# Patient Record
Sex: FEMALE | Race: WHITE
[De-identification: names, ages, dates, MRNs, and addresses within clinical notes are randomized per-mention and may not be internally consistent; named-entity substitution may affect disease eponyms.]

---

## 2019-03-18 ENCOUNTER — HOSPITAL ENCOUNTER (EMERGENCY)
Dept: HOSPITAL 59 - ER | Age: 34
Discharge: HOME | End: 2019-03-18
Payer: COMMERCIAL
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
## 2019-03-18 DIAGNOSIS — F10.10: Primary | ICD-10-CM

## 2019-03-18 DIAGNOSIS — I10: ICD-10-CM

## 2019-03-18 DIAGNOSIS — R11.2: ICD-10-CM

## 2019-03-18 DIAGNOSIS — F17.210: ICD-10-CM

## 2019-03-18 DIAGNOSIS — F07.9: ICD-10-CM

## 2019-03-18 LAB
ALBUMIN SERPL-MCNC: 4.8 G/DL (ref 4–5)
ALBUMIN/GLOB SERPL: 1.7 {RATIO} (ref 1.1–1.8)
ALP SERPL-CCNC: 62 U/L (ref 35–104)
ALT SERPL-CCNC: 22 U/L (ref ?–33)
ANION GAP SERPL CALC-SCNC: 19 MMOL/L (ref 7–16)
AST SERPL-CCNC: 65 U/L (ref 10–35)
BASOPHILS NFR BLD: 0.2 % (ref 0–6)
BILIRUB SERPL-MCNC: 0.9 MG/DL (ref 0.2–1)
BUN SERPL-MCNC: 13 MG/DL (ref 6–20)
CO2 SERPL-SCNC: 25 MMOL/L (ref 22–29)
CREAT SERPL-MCNC: 0.5 MG/DL (ref 0.5–0.9)
EOSINOPHIL NFR BLD: 0 % (ref 0–6)
ERYTHROCYTE [DISTWIDTH] IN BLOOD BY AUTOMATED COUNT: 14.8 % (ref 11.5–14.5)
EST GLOMERULAR FILTRATION RATE: > 60 ML/MIN
GLOBULIN SER-MCNC: 2.8 GM/DL (ref 1.4–4.8)
GLUCOSE SERPL-MCNC: 96 MG/DL (ref 74–109)
GRANULOCYTES NFR BLD: 66 % (ref 47–80)
HCT VFR BLD CALC: 38 % (ref 35–47)
HGB BLD-MCNC: 13.7 GM/DL (ref 11.6–16)
LYMPHOCYTES NFR BLD AUTO: 24.8 % (ref 16–45)
MCH RBC QN AUTO: 36.8 PG (ref 27–33)
MCHC RBC AUTO-ENTMCNC: 36.1 G/DL (ref 32–36)
MCV RBC AUTO: 102.2 FL (ref 81–97)
MONOCYTES NFR BLD: 9 % (ref 0–9)
PLATELET # BLD: 287 K/UL (ref 130–400)
PMV BLD AUTO: 8.6 FL (ref 7.4–10.4)
PROT SERPL-MCNC: 7.6 G/DL (ref 6.6–8.7)
RBC # BLD AUTO: 3.72 M/UL (ref 3.8–5.4)
WBC # BLD AUTO: 4.9 K/UL (ref 4.2–12.2)

## 2019-03-18 PROCEDURE — 96375 TX/PRO/DX INJ NEW DRUG ADDON: CPT

## 2019-03-18 PROCEDURE — 80320 DRUG SCREEN QUANTALCOHOLS: CPT

## 2019-03-18 PROCEDURE — 80053 COMPREHEN METABOLIC PANEL: CPT

## 2019-03-18 PROCEDURE — 99284 EMERGENCY DEPT VISIT MOD MDM: CPT

## 2019-03-18 PROCEDURE — 96365 THER/PROPH/DIAG IV INF INIT: CPT

## 2019-03-18 PROCEDURE — 93010 ELECTROCARDIOGRAM REPORT: CPT

## 2019-03-18 PROCEDURE — 85025 COMPLETE CBC W/AUTO DIFF WBC: CPT

## 2019-03-18 PROCEDURE — 93005 ELECTROCARDIOGRAM TRACING: CPT

## 2019-03-18 PROCEDURE — 83735 ASSAY OF MAGNESIUM: CPT

## 2019-03-18 NOTE — EMERGENCY DEPARTMENT RECORD
History of Present Illness





- General


Chief complaint: Dehydration


Stated complaint: DEHYDRATED/ALCOHOL RELATED


Time Seen by Provider: 19 19:08


Source: Patient


Mode of Arrival: Ambulatory


Limitations: No limitations





- History of Present Illness


Initial comments: 





Pt with long history of alcohol abuse relates issues with this abuse.  Pt 

states she had been "dry" for a month and then started drinking two weeks ago 

related to life stress.  Pts stated last drink was 48 hours ago.  She has  

history of withdraw seizures.  She has not had a seizure with this episode.  

She has been sleeping and had multiple episodes of emesis - without blood.  She 

has cramping abdominal pains.  She has no HA.  She is a smoker and denies other 

drug abuse.  +Hx of HTN and off meds today due to emesis. 


Onset/Timin


-: Week(s)


Location: Generalized


Severity: Moderate


Severity scale (1-10): 8


Quality: Aching


Consistency: Constant


Improves with: None


Worsens with: None


Associated Symptoms: Loss of appetite, Nausea/vomiting





- Related Data


 Home Medications











 Medication  Instructions  Recorded  Confirmed  Last Taken


 


Escitalopram Oxalate [Lexapro] 10 mg PO DAILY 19 Unknown


 


Losartan Potassium 50 mg PO DAILY 19 Unknown








 Previous Rx's











 Medication  Instructions  Recorded


 


Lorazepam [Ativan] 1 mg PO Q8HR PRN 3 Days #6 tablet 19











 Allergies











Allergy/AdvReac Type Severity Reaction Status Date / Time


 


amoxicillin [From Augmentin] AdvReac  NAUSEA Verified 19 19:00


 


clavulanic acid AdvReac  NAUSEA Verified 19 19:00





[From Augmentin]     


 


doxycycline AdvReac  NAUSEA Verified 19 19:00














Travel Screening





- Travel/Exposure Within Last 30 Days


Have you traveled within the last 30 days?: No





Review of Systems


Constitutional: Denies: Chills, Fever, Weakness


Eyes: Denies: Eye discharge, Photophobia


ENT: Denies: Congestion, Ear pain


Respiratory: Denies: Cough, Dyspnea, Hemoptysis


Cardiovascular: Denies: Arrhythmia, Chest pain, Syncope


Endocrine: Reports: Fatigue.  Denies: Polydipsia, Polyuria


Gastrointestinal: Reports: As per HPI, Abdominal pain, Nausea, Vomiting.  Denies

: Diarrhea, Hematemesis, Hematochezia, Melena


Genitourinary: Denies: Abnormal menses


Musculoskeletal: Denies: Back pain, Neck pain


Skin: Denies: Bruising, Rash


Neurological: Reports: Tremors.  Denies: Abnormal gait, Headache, Seizure


Psychiatric: Reports: Anxiety.  Denies: Homicidal thoughts, Suicidal thoughts


Hematological/Lymphatic: Denies: Anemia





Past Medical History





- SOCIAL HISTORY


Smoking Status: Current every day smoker


Alcohol Use: Heavy


Drug Use: None





- RESPIRATORY


Hx Respiratory Disorders: Yes


Hx Asthma: Yes





- CARDIOVASCULAR


Hx Cardio Disorders: No





- NEURO


Hx Neuro Disorders: Yes


Hx Seizures: Yes (since 2018)





- GI


Hx GI Disorders: Yes


Hx Abdominal Pain: Yes (colitis)


Hx Irritable Bowel: Yes (alcohol related)





- 


Hx Genitourinary Disorders: Yes


Hx Renal Disease: Yes (alcohol related)





- ENDOCRINE


Hx Endocrine Disorders: No





- MUSCULOSKELETAL


Hx Musculoskeletal Disorders: No





- PSYCH


Hx Psych Problems: Yes


Hx Anxiety: Yes


Hx Depression: Yes





- HEMATOLOGY/ONCOLOGY


Hx Hematology/Oncology Disorders: No





Family Medical History


Any Significant Family History?: No





Physical Exam





- General


General Appearance: Alert, Oriented x3, Cooperative, Mild distress





- Head


Head exam: Atraumatic





- Eye


Eye exam: PERRL, EOMI.  negative: Nystagmus





- ENT


ENT exam: Normal exam, Mucous membranes moist, Normal external ear exam, Normal 

orophraynx, TM's normal bilaterally





- Neck


Neck exam: Normal inspection, Full ROM.  negative: Tenderness





- Respiratory


Respiratory exam: Normal lung sounds bilaterally.  negative: Respiratory 

distress, Rhonchi, Wheezes





- Cardiovascular


Cardiovascular Exam: Regular rate, Normal rhythm, Normal heart sounds


Peripheral Pulses: 2+: Radial (R), Radial (L)





- GI/Abdominal


GI/Abdominal exam: Soft, Normal bowel sounds, Tenderness.  negative: Distended, 

Guarding, Rebound, Rigid





- Extremities


Extremities exam: Normal inspection, Full ROM, Normal capillary refill.  

negative: Joint swelling





- Back


Back exam: Reports: Normal inspection.  Denies: Vertebral tenderness





- Neurological


Neurological exam: Alert, CN II-XII intact, Normal gait, Oriented X3, Reflexes 

normal.  negative: Altered





- Psychiatric


Psychiatric exam: Anxious, Normal affect, Normal mood.  negative: Suicidal 

ideation





- Skin


Skin exam: Normal color.  negative: Rash





Course





 Vital Signs











  19





  18:49


 


Temperature 98.4 F


 


Pulse Rate 143 H


 


Respiratory 20





Rate 


 


Blood Pressure 152/117


 


Pulse Ox 99














- Reevaluation(s)


Reevaluation #1: 





19 19:15


Liliana nd IV fluids with lab ordered.  


Reevaluation #2: 





19 20:12


Pt improved with fluids and meds.  Long talk with pt and boyfriend Kristian in 

room.  Pt has gone to AA meetings in past and needs to return.  She is actively 

trying to NOT drink.  They are comfortable with home.  She has a supportive 

family physician.  





Procedures





- EKG Initial


Date: 19


Time: 19:22


EKG: Abnormal EKG (ST at 104 )





Medical Decision Making





- Management Options


MDM Management: No Additional Work-up Planned





- Data Complexity


MDM Data: Labs Ordered and/or Reviewed, EKG Ordered and/or Reviewed, 

Independent Visualization of Image, Tracing, or Specimen





- Lab Data


Result diagrams: 


 19 19:20





 19 19:20





- EKG Data


-: EKG Interpreted by Me


EKG: Abnormal EKG ()





Disposition


Disposition: Discharge


Clinical Impression: 


 Alcohol abuse, Alcohol withdrawal





Disposition: Home, Self-Care


Condition: (2) Stable


Additional Instructions: 


Should resume AA meetings daily. 


See your physician in 1-2 days.  


Take medication as instructed.  


Return to the ED as needed.  


NO ALCOHOL> 





Prescriptions: 


Lorazepam [Ativan] 1 mg PO Q8HR PRN 3 Days #6 tablet


 PRN Reason: Alcohol Withdrawal


Forms:  Patient Portal Access


Time of Disposition: 20:34





Quality





- Quality Measures


Quality Measures: N/A





- Blood Pressure Screening


Does Patient Have Any of the Following: No, Active Dx of HTN


Blood Pressure Classification: Hypertensive Reading


Systolic Measurement: 152


Diastolic Measurement: 117


Screening for High Blood Pressure: Patient Exclusion, Hx of HTN []